# Patient Record
Sex: FEMALE | Race: WHITE | NOT HISPANIC OR LATINO | URBAN - METROPOLITAN AREA
[De-identification: names, ages, dates, MRNs, and addresses within clinical notes are randomized per-mention and may not be internally consistent; named-entity substitution may affect disease eponyms.]

---

## 2020-03-03 ENCOUNTER — LAB VISIT (OUTPATIENT)
Dept: LAB | Facility: HOSPITAL | Age: 30
End: 2020-03-03
Attending: MEDICAL GENETICS
Payer: COMMERCIAL

## 2020-03-03 DIAGNOSIS — Z82.79 FAMILY HISTORY OF CONGENITAL OR GENETIC CONDITION: ICD-10-CM

## 2020-03-03 DIAGNOSIS — Z82.79 FAMILY HISTORY OF CONGENITAL OR GENETIC CONDITION: Primary | ICD-10-CM

## 2020-03-03 PROCEDURE — 36415 COLL VENOUS BLD VENIPUNCTURE: CPT

## 2020-04-29 LAB
GENETIC COUNSELING?: YES
GENSO SPECIMEN TYPE: NORMAL
MISCELLANEOUS GENETIC TEST NAME: NORMAL
PARTENTAL OR SIBLING TESTING?: YES
REFERENCE LAB: NORMAL
TEST RESULT: NORMAL

## 2023-02-08 ENCOUNTER — TELEPHONE (OUTPATIENT)
Dept: MATERNAL FETAL MEDICINE | Facility: CLINIC | Age: 33
End: 2023-02-08
Payer: COMMERCIAL

## 2023-02-08 ENCOUNTER — PATIENT MESSAGE (OUTPATIENT)
Dept: MATERNAL FETAL MEDICINE | Facility: CLINIC | Age: 33
End: 2023-02-08
Payer: COMMERCIAL

## 2023-02-08 NOTE — TELEPHONE ENCOUNTER
Message left for pt to call [facility] clinic at [phone] to schedule consult with our [facility] genetic counselor.

## 2023-02-08 NOTE — TELEPHONE ENCOUNTER
Returned call to patient and Virtual Consult visit scheduled.         ----- Message from Ella Hadley RN sent at 2/8/2023 12:51 PM CST -----  Contact: Shi [phone]    ----- Message -----  From: Nicky Sharpe  Sent: 2/8/2023  12:36 PM CST  To: [facility] Maternal Fetal Medicine Staff    Returning a phone call.    Who left a message for the patient:  Yoel Tellez RN    Do they know what this is regarding:  yes    Would they like a phone call back or a response via [facility]:   Call back

## 2023-02-10 ENCOUNTER — OFFICE VISIT (OUTPATIENT)
Dept: MATERNAL FETAL MEDICINE | Facility: CLINIC | Age: 33
End: 2023-02-10
Payer: COMMERCIAL

## 2023-02-10 DIAGNOSIS — Z84.89 FAMILY HISTORY OF GENETIC DISORDER: Primary | ICD-10-CM

## 2023-02-10 PROCEDURE — 96040 PR GENETIC COUNSELING, EACH 30 MIN: CPT | Mod: 95,,, | Performed by: GENETIC COUNSELOR, MS

## 2023-02-10 PROCEDURE — 96040 PR GENETIC COUNSELING, EACH 30 MIN: ICD-10-PCS | Mod: 95,,, | Performed by: GENETIC COUNSELOR, MS

## 2023-02-10 PROCEDURE — 99499 UNLISTED E&M SERVICE: CPT | Mod: 95,,, | Performed by: GENETIC COUNSELOR, MS

## 2023-02-10 PROCEDURE — 99499 NO LOS: ICD-10-PCS | Mod: 95,,, | Performed by: GENETIC COUNSELOR, MS

## 2023-02-10 NOTE — PROGRESS NOTES
Office Visit - Genetic Counseling Evaluation   Shi Alexis  : 1990  MRN: [id number]  REFERRING PROVIDER: Aaarefalexis Self  PARTNER NAME: Jairo    DATE OF SERVICE: 2/10/23  TIME SPENT: 25 min    REASON FOR CONSULT:  Shi Alexis, a 32 y.o. 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 She came to the appointment with her son. She and her partner were found to be carriers after her son, Brigida, was diagnosed with Primary Ciliary Dyskinesia resulting from biallelic DNAI2 mutations. He was initially brought to attention due to  RDS. This prompted imaging reportedly revealing situs inversus. Brigida was transferred to [facility] due to worsening respiratory status. He was on O2 for 7 days. Pulmonology was consulted due to concern for PCD. Nasal scraping was sent and revealed no ultrastructural abnormality of the cilia. He was eventually seen by Dr. Alvarado and completed PCD work-up which revealed the underlying DNAI2 mutations. Shi is newly pregnant and is concerned about recurrence in this pregnancy. Her concern is that a second affected child may have a more severe presentation of PCD than Brigida, she is also hoping to prepare for the risk of RDS and a potential NICU stay.    OBSETRIC HISTORY   AGE AT KIANNA: 32y  KIANNA: To be determined  GESTATION: To be determined  GESTATIONAL AGE:To be determined    PREGNANCY HISTORY  G1: Term  G2: Term  G3: current    MEDICAL HISTORY:  MEDICATIONS/EXPOSURES: Not discussed, none on file  There is no problem list on file for this patient.    No current outpatient medications on file.     FAMILY HISTORY:  Family history was updated since genetics visit in  and pt reports no new family history.     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     Primary ciliary dyskinesia (also known as Kartagener's syndrome) affects the structure or function of cilia. As a result individuals with PCD often have increased risks for pulmonary disease, chronic upper respiratory infections, nasal congestion, chronic ear infections and infertility in males. About 75% of individuals with PCD have  respiratory distress. About 50% of individuals with primary ciliary dyskinesia have situs inversus and about 10% have heterotaxy.    DNAI2 related Primary Ciliary Dyskinesia is inherited in an autosomal recessive manner. This results in a 25% recurrence risk for any of Kiran's future children together. Ultrasound has the ability to detect situs inversus or heterotaxy. However, as noted above, penetrance of these findings in PCD is incomplete. We discussed options for prenatal diagnostic testing including CVS and amniocentesis.     Prenatal diagnostic testing options for definitive cytogenetic diagnosis of aneuploidy (CVS and amniocentesis) were discussed. The procedure-related risk for pregnancy loss of ~1:500 for CVS and ~1:900 for amniocentesis was reviewed, as was the 1 - 2% risk for a mosaic result with CVS. For this indication, analysis of chorionic villi or amniocytes should include panel testing for PCD genes.     Turn-around time for this testing was discussed. We reviewed that  diagnosis is available also, likely via blood draw from the infant/child.    After our discussion Shi stated that she was not certain how she would like to proceed. She  would like to be seen by an [facility] provider closer to home. She lives just outside of [place], [place] and would prefer to be seen there.     DISCUSSION & IMPRESSION:  Shi is a 32 y.o. female with DNAI2 mutations in herself and her partner. This pregnancy is at 25% risk of inheriting both mutations and therefore Primary Ciliary Dyskinesia. She is concerned about these risks and is considering her options for testing. We discussed the above information in detail. She would like to be seen in [place] for her care going forward. We dicussed that aside from a CVS procedure she should be able to receive the same level of care in both locations.     TESTING OPTIONS  Diagnostic Testing: CVS or Amniocentesis  Carrier Screening: NA  Pregnancy Options: Not reviewed  Recurrence Risk: 1 in 4 chance of a second affected child with PCD  Procedures/labs DECLINED today: None    Shi stated that she was uncertain what testing she would like to have at this time and that she wanted to have her [facility] care in [place] to be closer to home.     We reviewed Shi's medical and family history. We discussed basics of genetics and diagnostic testing options. Shi was understanding of the information discussed in clinic and all questions were answered.     Per the indication for referral this visit was conducted by a genetic counselor. This patient will not be seen by an [facility] physician and your patient will not be scheduled for additional visits. If you have questions or concerns about this please reach out to our clinic and let us know any additional concerns you hope to be addressed by the maternal fetal medicine physicians that may be out of the scope of this visit. Thank you for your referral and the opportunity to participate in the care of your patients.     RECOMMENDATIONS:  Forward records to [facility] in [place], [place]    Alicia Hernandez MS, [facility]  Licensed, Certified Genetic Counselor  [facility]